# Patient Record
Sex: MALE | Race: WHITE
[De-identification: names, ages, dates, MRNs, and addresses within clinical notes are randomized per-mention and may not be internally consistent; named-entity substitution may affect disease eponyms.]

---

## 2019-09-16 ENCOUNTER — HOSPITAL ENCOUNTER (EMERGENCY)
Dept: HOSPITAL 26 - MED | Age: 29
Discharge: HOME | End: 2019-09-16
Payer: SELF-PAY

## 2019-09-16 VITALS — HEIGHT: 68 IN | BODY MASS INDEX: 26.9 KG/M2 | WEIGHT: 177.5 LBS

## 2019-09-16 VITALS — SYSTOLIC BLOOD PRESSURE: 144 MMHG | DIASTOLIC BLOOD PRESSURE: 78 MMHG

## 2019-09-16 VITALS — DIASTOLIC BLOOD PRESSURE: 78 MMHG | SYSTOLIC BLOOD PRESSURE: 127 MMHG

## 2019-09-16 DIAGNOSIS — R03.0: ICD-10-CM
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
## 2019-09-16 DIAGNOSIS — S40.022A: Primary | ICD-10-CM
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
## 2019-09-16 PROCEDURE — 93971 EXTREMITY STUDY: CPT

## 2019-09-16 PROCEDURE — 99284 EMERGENCY DEPT VISIT MOD MDM: CPT

## 2019-09-16 NOTE — NUR
Patient discharged with v/s stable. Written and verbal after care instructions 
given and explained. 

Patient alert, oriented and verbalized understanding of instructions. 
Ambulatory with to home. All questions addressed prior to discharge. ID band 
removed. Patient advised to follow up with PMD. Rx of NAPROXEN 500 MG given. 
Patient educated on indication of medication including possible reaction and 
side effects. Opportunity to ask questions provided and answered.

## 2019-09-16 NOTE — NUR
PT C/O LT BICEP HEMATOMA. STATES HE MAY HAVE GOTTEN BIT BY BUG, DENIES TRAUMA 
TO AREA. PALPABLE BUMP IN MIDDLE OF BICEP. PT STATES BRUISING HAS PROGRESSIVELY 
GOTTEN WORSE. 5/10 PAIN, PULSATING. RESPIRATIONS EVEN, UNLABORED. SITTING IN 
BED CALM AND PLEASANT 



MEDHX: DENIES 

ALLERGIES: DENIES